# Patient Record
Sex: FEMALE | Race: WHITE | NOT HISPANIC OR LATINO | Employment: STUDENT | URBAN - METROPOLITAN AREA
[De-identification: names, ages, dates, MRNs, and addresses within clinical notes are randomized per-mention and may not be internally consistent; named-entity substitution may affect disease eponyms.]

---
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
## 2024-04-23 ENCOUNTER — TELEPHONE (OUTPATIENT)
Dept: OBSTETRICS AND GYNECOLOGY | Facility: CLINIC | Age: 19
End: 2024-04-23
Payer: MEDICAID

## 2024-04-24 ENCOUNTER — OFFICE VISIT (OUTPATIENT)
Dept: OBSTETRICS AND GYNECOLOGY | Facility: CLINIC | Age: 19
End: 2024-04-24
Payer: MEDICAID

## 2024-04-24 VITALS — DIASTOLIC BLOOD PRESSURE: 70 MMHG | WEIGHT: 216.06 LBS | SYSTOLIC BLOOD PRESSURE: 118 MMHG

## 2024-04-24 DIAGNOSIS — Z30.09 ENCOUNTER FOR COUNSELING REGARDING INITIATION OF OTHER CONTRACEPTIVE MEASURE: Primary | ICD-10-CM

## 2024-04-24 PROCEDURE — 3074F SYST BP LT 130 MM HG: CPT | Mod: CPTII,,, | Performed by: OBSTETRICS & GYNECOLOGY

## 2024-04-24 PROCEDURE — 99214 OFFICE O/P EST MOD 30 MIN: CPT | Mod: PBBFAC,PN | Performed by: OBSTETRICS & GYNECOLOGY

## 2024-04-24 PROCEDURE — 99203 OFFICE O/P NEW LOW 30 MIN: CPT | Mod: S$PBB,,, | Performed by: OBSTETRICS & GYNECOLOGY

## 2024-04-24 PROCEDURE — 1159F MED LIST DOCD IN RCRD: CPT | Mod: CPTII,,, | Performed by: OBSTETRICS & GYNECOLOGY

## 2024-04-24 PROCEDURE — 3078F DIAST BP <80 MM HG: CPT | Mod: CPTII,,, | Performed by: OBSTETRICS & GYNECOLOGY

## 2024-04-24 PROCEDURE — 99999 PR PBB SHADOW E&M-EST. PATIENT-LVL IV: CPT | Mod: PBBFAC,,, | Performed by: OBSTETRICS & GYNECOLOGY

## 2024-04-24 RX ORDER — DROSPIRENONE AND ETHINYL ESTRADIOL 0.02-3(28)
1 KIT ORAL DAILY
Qty: 30 TABLET | Refills: 11 | Status: SHIPPED | OUTPATIENT
Start: 2024-04-24 | End: 2025-04-24

## 2024-04-24 NOTE — PROGRESS NOTES
18 y.o.   OB History          0    Para   0    Term   0       0    AB   0    Living   0         SAB   0    IAB   0    Ectopic   0    Multiple   0    Live Births   0               Comlaining of: new pt, wants ocp  Hx reg cycles, some cramps  No gi or gu issues  Neg gyn hx  Not active        ROS:  GENERAL: No fever, chills, fatigability or weight loss.  SKIN: No rashes, itching or changes in color or texture of skin.  HEAD: No headaches or recent head trauma.  EYES: Visual acuity fine. No photophobia, ocular pain or diplopia.  EARS: Denies ear pain, discharge or vertigo.  NOSE: No loss of smell, no epistaxis or postnasal drip.  MOUTH & THROAT: No hoarseness or change in voice. No excessive gum bleeding.  NODES: Denies swollen glands.  CHEST: Denies MERRITT, cyanosis, wheezing, cough and sputum production.  CARDIOVASCULAR: Denies chest pain, PND, orthopnea or reduced exercise tolerance.  ABDOMEN: Appetite fine. No weight loss. Denies diarrhea, abdominal pain, hematemesis or blood in stool.  URINARY: No flank pain, dysuria or hematuria.  PERIPHERAL VASCULAR: No claudication or cyanosis.  MUSCULOSKELETAL: No joint stiffness or swelling. Denies back pain.  NEUROLOGIC: No history of seizures, paralysis, alteration of gait or coordination      PE: /70   Wt 98 kg (216 lb 0.8 oz)   LMP 2024    Exam def  Discussed contraception  Ocp use  Pro and con  Pt will take ocp, can start tonight since just ended cycle 2 days  Disc how to use, be vigilant :)  Call prn  Fu one year  A/P  Contraceptive counselling   I spent a total of 25 minutes on the day of the visit.This includes face to face time and non-face to face time preparing to see the patient (eg, review of tests), obtaining and/or reviewing separately obtained history, documenting clinical information in the electronic or other health record, independently interpreting results and communicating results to the patient/family/caregiver, or care  coordinator           General

## 2024-08-27 ENCOUNTER — PATIENT MESSAGE (OUTPATIENT)
Dept: OBSTETRICS AND GYNECOLOGY | Facility: CLINIC | Age: 19
End: 2024-08-27
Payer: MEDICAID

## 2024-09-23 ENCOUNTER — PATIENT MESSAGE (OUTPATIENT)
Dept: OBSTETRICS AND GYNECOLOGY | Facility: CLINIC | Age: 19
End: 2024-09-23
Payer: MEDICAID

## 2024-09-24 NOTE — TELEPHONE ENCOUNTER
Pt was supposed to start her pill on Sunday, but did not due to still bleeding. Advised pt that she still should have started her pills even with the bleeding. Can pt start pills now?

## 2024-09-25 NOTE — TELEPHONE ENCOUNTER
Right, start the pill on the Sunday after cycle starrts even if still on cycle,, can start now  thanks

## 2025-07-30 ENCOUNTER — OFFICE VISIT (OUTPATIENT)
Dept: OTOLARYNGOLOGY | Facility: CLINIC | Age: 20
End: 2025-07-30
Payer: MEDICAID

## 2025-07-30 VITALS — WEIGHT: 208.31 LBS

## 2025-07-30 DIAGNOSIS — J30.2 SEASONAL ALLERGIC RHINITIS, UNSPECIFIED TRIGGER: ICD-10-CM

## 2025-07-30 DIAGNOSIS — J03.91 RECURRENT TONSILLITIS: Primary | ICD-10-CM

## 2025-07-30 PROCEDURE — 99203 OFFICE O/P NEW LOW 30 MIN: CPT | Mod: S$PBB,,, | Performed by: OTOLARYNGOLOGY

## 2025-07-30 PROCEDURE — 99999 PR PBB SHADOW E&M-EST. PATIENT-LVL III: CPT | Mod: PBBFAC,,, | Performed by: OTOLARYNGOLOGY

## 2025-07-30 PROCEDURE — 1159F MED LIST DOCD IN RCRD: CPT | Mod: CPTII,,, | Performed by: OTOLARYNGOLOGY

## 2025-07-30 PROCEDURE — 99213 OFFICE O/P EST LOW 20 MIN: CPT | Mod: PBBFAC,PO | Performed by: OTOLARYNGOLOGY

## 2025-07-30 RX ORDER — CETIRIZINE HYDROCHLORIDE 10 MG/1
10 TABLET ORAL DAILY
Qty: 30 TABLET | Refills: 6 | Status: SHIPPED | OUTPATIENT
Start: 2025-07-30 | End: 2026-07-30

## 2025-07-30 NOTE — PROGRESS NOTES
Pediatric Otolaryngology- Head & Neck Surgery   New Patient Visit    Chief Complaint: recurrent tonsillitis    HPI  Summer Stevens is a 19 y.o. old female referred to the pediatric otolaryngology clinic for tonsillitis.  This has occurred 3-4 times in the last year,  none the year before that, and none the year before that.  Episodes have been treated with antibiotics each time. There is no halitoses. Does not have problems missing school. The problem is described as moderate.     Has nasal allergies. Was doing well on zyrtec but does not have it. Has PND. No cough     + snoring, without apneas. Sleeps well     No dysphagia. Weight is stable.     Medical History  No past medical history on file.    Surgical History  No past surgical history on file.    Medications  Medications Ordered Prior to Encounter[1]    Allergies  Review of patient's allergies indicates:  No Known Allergies    Social History  There are no smokers in the home    Family History  There is no family history of bleeding disorders or problems with anesthesia.    Review of Systems  General: no fever, no recent weight change  Eyes: no vision changes  Pulm: no asthma  Heme: no bleeding or anemia  GI:  No GERD  Endo: No DM or thyroid problems  Musculoskeletal: no arthritis  Neuro: no seizures, speech or developmental delay  Skin: no rash  Psych: no psych history  Allergery/Immune: no allergy history or history of immunologic deficiency  Cardiac: no congenital cardiac abnormality      Physical Exam  General:  Alert, well developed, comfortable  Voice:  Regular for age, good volume  Respiratory:  Symmetric breathing, no stridor, no distress  Head:  Normocephalic, no lesions  Face: Symmetric, HB 1/6 bilat, no lesions, no obvious sinus tenderness, salivary glands nontender  Eyes:  Sclera white, extraocular movements intact  Nose: Dorsum straight, septum midline, normal turbinate size, normal mucosa  Right Ear: Pinna and external ear appears normal, EAC  patent, TM intact, mobile, without middle ear effusion  Left Ear: Pinna and external ear appears normal, EAC patent, TM intact, mobile, without middle ear effusion  Hearing:  Grossly intact  Oral cavity: Healthy mucosa, no masses or lesions including lips, teeth, gums, floor of mouth, palate, or tongue.  Oropharynx: Tonsils 2+, palate intact, normal pharyngeal wall movement  Neck: Supple, no palpable nodes, no masses, trachea midline, no thyroid masses  Cardiovascular system:  Pulses regular in both upper extremities, good skin turgor   Neuro: CN II-XII grossly intact  Skin: no rash    Studies Reviewed  NA    Procedures  NA    Impression    1. Recurrent tonsillitis        2. Seasonal allergic rhinitis, unspecified trigger            Recurrent.  I had a discussion regarding observation versus tonsillectomy, and at this point would recommend observation. Discussed indications / criteria for tonsillectomy    Treatment Plan   Return if episodes persist  renetta Chacon MD  Pediatric Otolaryngology Attending           [1]   Current Outpatient Medications on File Prior to Visit   Medication Sig Dispense Refill    cloNIDine (CATAPRES) 0.2 MG tablet TAKE 1 TABLET BY MOUTH EVERY NIGHT AT BEDTIME 30 tablet 1    lisdexamfetamine (VYVANSE) 30 MG capsule take 1 capsule by mouth every morning 30 capsule 0    amoxicillin (AMOXIL) 500 MG capsule Take 2 capsules (1,000 mg total) by mouth 2 (two) times a day. 40 capsule 0    amoxicillin-clavulanate 875-125mg (AUGMENTIN) 875-125 mg per tablet take 1 tablet twice a day (Patient not taking: Reported on 7/30/2025) 14 tablet 0    cefdinir (OMNICEF) 300 MG capsule Take 1 capsule (300 mg total) by mouth 2 (two) times a day. (Patient not taking: Reported on 7/30/2025) 20 capsule 0    cetirizine (ZYRTEC) 10 MG tablet Take 1 tablet  daily (Patient not taking: Reported on 7/30/2025) 30 tablet 0    cetirizine (ZYRTEC) 10 MG tablet Take 1 tablet (10 mg total) by mouth once daily. (Patient  not taking: Reported on 7/30/2025) 30 tablet 6    clindamycin-benzoyl peroxide (BENZACLIN) gel Apply topically 2 (two) times a day. (Patient not taking: Reported on 7/30/2025) 25 g 0    cloNIDine (CATAPRES) 0.2 MG tablet TAKE 1 TABLET BY MOUTH EVERY NIGHT AT BEDTIME (Patient not taking: Reported on 7/30/2025) 30 tablet 1    cloNIDine (CATAPRES) 0.2 MG tablet TAKE 1 TABLET BY MOUTH EVERY NIGHT AT BEDTIME (Patient not taking: Reported on 7/30/2025) 30 tablet 1    cloNIDine (CATAPRES) 0.2 MG tablet TAKE 1 TABLET BY MOUTH EVERY NIGHT AT BEDTIME (Patient not taking: Reported on 7/30/2025) 30 tablet 1    cloNIDine (CATAPRES) 0.2 MG tablet TAKE 1 TABLET BY MOUTH EVERY NIGHT AT BEDTIME (Patient not taking: Reported on 7/30/2025) 30 tablet 1    cloNIDine (CATAPRES) 0.2 MG tablet TAKE 1 TABLET BY MOUTH EVERY NIGHT AT BEDTIME (Patient not taking: Reported on 7/30/2025) 30 tablet 1    cloNIDine (CATAPRES) 0.2 MG tablet TAKE 1 TABLET BY MOUTH EVERY NIGHT AT BEDTIME (Patient not taking: Reported on 7/30/2025) 30 tablet 1    cloNIDine (CATAPRES) 0.2 MG tablet TAKE 1 TABLET BY MOUTH EVERY NIGHT AT BEDTIME (Patient not taking: Reported on 7/30/2025) 30 tablet 1    cloNIDine (CATAPRES) 0.2 MG tablet TAKE 1 TABLET BY MOUTH EVERY NIGHT AT BEDTIME (Patient not taking: Reported on 7/30/2025) 30 tablet 1    cloNIDine (CATAPRES) 0.2 MG tablet TAKE 1 TABLET BY MOUTH EVERY NIGHT AT BEDTIME (Patient not taking: Reported on 7/30/2025) 30 tablet 1    cloNIDine (CATAPRES) 0.2 MG tablet TAKE 1 TABLET BY MOUTH EVERY NIGHT AT BEDTIME (Patient not taking: Reported on 7/30/2025) 30 tablet 1    cloNIDine (CATAPRES) 0.2 MG tablet TAKE 1 TABLET BY MOUTH EVERY NIGHT AT BEDTIME (Patient not taking: Reported on 7/30/2025) 30 tablet 1    cloNIDine (CATAPRES) 0.2 MG tablet Take 1 tablet (0.2 mg total) by mouth every evening. (Patient not taking: Reported on 7/30/2025) 30 tablet 1    cloNIDine (CATAPRES) 0.2 MG tablet TAKE 1 TABLET BY MOUTH EVERY NIGHT AT  BEDTIME (Patient not taking: Reported on 7/30/2025) 30 tablet 1    cloNIDine (CATAPRES) 0.2 MG tablet TAKE 1 TABLET BY MOUTH EVERY NIGHT AT BEDTIME (Patient not taking: Reported on 7/30/2025) 30 tablet 2    cloNIDine (CATAPRES) 0.2 MG tablet TAKE 1 TABLET BY MOUTH EVERY NIGHT AT BEDTIME (Patient not taking: Reported on 7/30/2025) 30 tablet 2    cloNIDine (CATAPRES) 0.2 MG tablet TAKE 1 TABLET BY MOUTH EVERY NIGHT AT BEDTIME (Patient not taking: Reported on 7/30/2025) 30 tablet 2    cloNIDine (CATAPRES) 0.2 MG tablet TAKE 1 TABLET BY MOUTH EVERY NIGHT AT BEDTIME (Patient not taking: Reported on 7/30/2025) 30 tablet 2    drospirenone-ethinyl estradioL (EMA) 3-0.02 mg per tablet Take 1 tablet by mouth once daily. 30 tablet 11    lisdexamfetamine (VYVANSE) 30 MG capsule Take 1 capsule (30 mg total) by mouth every morning. (Patient not taking: Reported on 7/30/2025) 30 capsule 0    lisdexamfetamine (VYVANSE) 30 MG capsule take one capsule by mouth every morning (Patient not taking: Reported on 7/30/2025) 30 capsule 0    lisdexamfetamine (VYVANSE) 30 MG capsule take 1 capsule by mouth every morning (Patient not taking: Reported on 7/30/2025) 30 capsule 0    lisdexamfetamine (VYVANSE) 30 MG capsule take one by mouth  every morning. (Patient not taking: Reported on 7/30/2025) 30 capsule 0    lisdexamfetamine (VYVANSE) 30 MG capsule take 1 tablet by mouth every morning (Patient not taking: Reported on 7/30/2025) 30 capsule 0    lisdexamfetamine (VYVANSE) 30 MG capsule take 1 tablet by mouth every morning (Patient not taking: Reported on 7/30/2025) 30 capsule 0    lisdexamfetamine (VYVANSE) 30 MG capsule take 1 capsule by mouth every morning (Patient not taking: Reported on 7/30/2025) 30 capsule 0    lisdexamfetamine (VYVANSE) 30 MG capsule Take 1 capsule (30 mg total) by mouth every morning. (Patient not taking: Reported on 7/30/2025) 30 capsule 0    lisdexamfetamine (VYVANSE) 30 MG capsule take 1 capsule by mouth every  morning (Patient not taking: Reported on 7/30/2025) 30 capsule 0    lisdexamfetamine (VYVANSE) 30 MG capsule One po q am (Patient not taking: Reported on 7/30/2025) 30 capsule 0    lisdexamfetamine (VYVANSE) 30 MG capsule Take 1 capsule every morning (Patient not taking: Reported on 7/30/2025) 30 capsule 0    lisdexamfetamine (VYVANSE) 30 MG capsule Take 1 capsule (30 mg total) by mouth every morning. (Patient not taking: Reported on 7/30/2025) 30 capsule 0    lisdexamfetamine (VYVANSE) 30 MG capsule take 1 capsule by mouth every morning (Patient not taking: Reported on 7/30/2025) 30 capsule 0    lisdexamfetamine (VYVANSE) 30 MG capsule take 1 capsule by mouth every morning (Patient not taking: Reported on 7/30/2025) 30 capsule 0    lisdexamfetamine (VYVANSE) 30 MG capsule Take 1 capsule (30 mg total) by mouth every morning. (Patient not taking: Reported on 7/30/2025) 30 capsule 0    lisdexamfetamine (VYVANSE) 30 MG capsule Take 1 capsule (30 mg total) by mouth every morning. (Patient not taking: Reported on 7/30/2025) 30 capsule 0    lisdexamfetamine (VYVANSE) 30 MG capsule take 1 capsule by mouth every morning (Patient not taking: Reported on 7/30/2025) 30 capsule 0    lisdexamfetamine (VYVANSE) 30 MG capsule take one capsule by mouth every morning (Patient not taking: Reported on 7/30/2025) 30 capsule 0    lisdexamfetamine (VYVANSE) 30 MG capsule Take 1 capsule every morning (Patient not taking: Reported on 7/30/2025) 30 capsule 0    lisdexamfetamine (VYVANSE) 30 MG capsule take 1 capsule by mouth every morning (Patient not taking: Reported on 7/30/2025) 30 capsule 0    methylphenidate HCl 54 MG CR tablet Take 1 tablet (54 mg total) by mouth every morning. (Patient not taking: Reported on 7/30/2025) 30 tablet 0    methylphenidate HCl 54 MG CR tablet Take 1 tablet (54 mg total) by mouth every morning. (Patient not taking: Reported on 7/30/2025) 30 tablet 0    methylphenidate HCl 54 MG CR tablet Take 1 tablet (54  mg total) by mouth every morning. (Patient not taking: Reported on 7/30/2025) 30 tablet 0    methylphenidate HCl 54 MG CR tablet Take 1 tablet (54 mg total) by mouth every morning (Patient not taking: Reported on 7/30/2025) 30 tablet 0    methylphenidate HCl 54 MG CR tablet TAKE 1 TABLET BY MOUTH EVERY MORNING (Patient not taking: Reported on 7/30/2025) 30 tablet 0    methylphenidate HCl 54 MG CR tablet Take 1 tablet (54 mg total) by mouth every morning. 30 tablet 0    methylphenidate HCl 54 MG CR tablet Take 1 tablet by mouth once daily in the morning. 30 tablet 0    methylphenidate HCl 54 MG CR tablet TAKE 1 TABLET BY MOUTH EVERY MORNING 30 tablet 0    methylphenidate HCl 54 MG CR tablet TAKE 1 TABLET BY MOUTH EVERY MORNING 30 tablet 0    omeprazole (PRILOSEC) 20 MG capsule Take 1 capsule (20 mg total) by mouth once daily. (Patient not taking: Reported on 7/30/2025) 90 capsule 0     No current facility-administered medications on file prior to visit.